# Patient Record
Sex: MALE | Race: OTHER | NOT HISPANIC OR LATINO | URBAN - METROPOLITAN AREA
[De-identification: names, ages, dates, MRNs, and addresses within clinical notes are randomized per-mention and may not be internally consistent; named-entity substitution may affect disease eponyms.]

---

## 2021-01-11 ENCOUNTER — EMERGENCY (EMERGENCY)
Facility: HOSPITAL | Age: 41
LOS: 0 days | Discharge: HOME | End: 2021-01-11
Attending: EMERGENCY MEDICINE | Admitting: EMERGENCY MEDICINE
Payer: COMMERCIAL

## 2021-01-11 VITALS
SYSTOLIC BLOOD PRESSURE: 138 MMHG | OXYGEN SATURATION: 98 % | HEIGHT: 66 IN | RESPIRATION RATE: 19 BRPM | TEMPERATURE: 98 F | DIASTOLIC BLOOD PRESSURE: 73 MMHG | HEART RATE: 76 BPM | WEIGHT: 195.99 LBS

## 2021-01-11 DIAGNOSIS — E11.9 TYPE 2 DIABETES MELLITUS WITHOUT COMPLICATIONS: ICD-10-CM
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
## 2021-01-11 DIAGNOSIS — Z91.018 ALLERGY TO OTHER FOODS: ICD-10-CM

## 2021-01-11 DIAGNOSIS — M25.519 PAIN IN UNSPECIFIED SHOULDER: ICD-10-CM
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
## 2021-01-11 DIAGNOSIS — S20.211A CONTUSION OF RIGHT FRONT WALL OF THORAX, INITIAL ENCOUNTER: ICD-10-CM
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
## 2021-01-11 PROCEDURE — 99282 EMERGENCY DEPT VISIT SF MDM: CPT

## 2021-01-11 NOTE — ED PROVIDER NOTE - NSFOLLOWUPINSTRUCTIONS_ED_ALL_ED_FT
Pectoralis Major Tear       A pectoralis major tear (rupture) is an injury in one of the muscles in the chest (pectoralis major). There are two of these muscles in the chest, one on each side. The pectoralis major muscles help straighten the arms and are used when a person pushes forward. A pectoralis major tear often separates the muscle away from bone along the breastbone, collarbone, or upper arm. This injury causes pain and weakness in the chest and shoulder.      What are the causes?    This condition is caused by putting too much stress on the pectoralis major muscle. This is most commonly a result of weight lifting.      What increases the risk?  This condition is more likely to develop in men, especially men who use anabolic steroids. This condition is also more likely to develop in people who participate in any of the following activities:  •Weight lifting.      •Rugby.      •Football.      •Gymnastics.      •Skiing.      •Wrestling.      •Boxing.      •Hockey.        What are the signs or symptoms?  At the time of the tear, you may hear a noise, like a pop or a snap, and feel a ripping pain. Other signs and symptoms may include:  •Swelling.      •Bruising.      •Weakness.      •A bulge or abnormality in the muscle (deformity).      •Pain and tenderness when pressing on the muscle.        How is this diagnosed?  This condition may be diagnosed based on:  •Your symptoms.      •Your medical history.    •A physical exam. Your health care provider may:  •Compare one side of your chest to the other.      •Check for weakness, bruising, and tenderness.      •Imaging tests to find the exact location of the tear and check how severe it is. Tests may include:  •An MRI.      •An ultrasound.          How is this treated?    In young, active people and athletes, this condition is usually treated with surgery to reattach the muscle and repair the tear. If your tear is minor, or if you are older, you may not need surgery.  Nonsurgical treatment may include:  •Wearing a sling for 3–6 weeks to keep your arm still (immobilization).      •Taking NSAIDs to help relieve pain and swelling.      •Doing physical therapy to improve your range of motion and strength.        Follow these instructions at home:    If you have a sling:     •Wear the sling as told by your health care provider. Remove it only as told by your health care provider.      •Loosen the sling if your fingers tingle, become numb, or turn cold and blue.      •Keep the sling clean.    •If the sling is not waterproof:  •Do not let it get wet.      •Cover it with a watertight covering when you take a bath or shower.          Managing pain, stiffness, and swelling    •If directed, put ice on your injured area.  •If you have a removable sling, remove it as told by your health care provider.      •Put ice in a plastic bag.      •Place a towel between your skin and the bag.      •Leave the ice on for 20 minutes, 2–3 times a day.        •Move your fingers often to reduce stiffness and swelling.      Activity     •Ask your health care provider when it is safe to drive if you have a sling on your arm.      •Do exercises as told by your health care provider.      •Return to your normal activities as told by your health care provider. Ask your health care provider what activities are safe for you.      General instructions     •Take over-the-counter and prescription medicines only as told by your health care provider.      • Do not take baths, swim, or use a hot tub until your health care provider approves. Ask your health care provider if you may take showers. You may only be allowed to take sponge baths.      • Do not use any products that contain nicotine or tobacco, such as cigarettes, e-cigarettes, and chewing tobacco. These can delay healing. If you need help quitting, ask your health care provider.      •Keep all follow-up visits as told by your health care provider. This is important.        How is this prevented?    • Do not use anabolic steroids.      •If you start a weight lifting program, make sure that you have supervision. As your fitness improves, you may add weight gradually.      •Warm up and stretch before being active.      •Cool down and stretch after being active.      •Give your body time to rest between periods of activity.      •Make sure to use equipment that fits you.      •Be safe and responsible while being active. This will help you avoid falls.    •Maintain physical fitness, including:  •Strength.      •Flexibility.      •Endurance.          Contact a health care provider if:    •You continue to have pain, swelling, or weakness after 4 weeks.      •Exercising makes your symptoms worse.        Summary    •A pectoralis major tear often separates the muscle away from bone along the breastbone, collarbone, or upper arm.      •This injury causes pain and weakness in the chest and shoulder.      •This condition is caused by putting too much stress on the pectoralis major muscle.      •In young, active people and athletes, this condition is usually treated with surgery to reattach the muscle and repair the tear.      •If the tear is minor or you are older, the injury is treated with rest, a sling, medicines for pain and swelling, and physical therapy.      This information is not intended to replace advice given to you by your health care provider. Make sure you discuss any questions you have with your health care provider.

## 2021-01-11 NOTE — ED PROVIDER NOTE - OBJECTIVE STATEMENT
40 y.o. M with no PMH with right sided shoulder pain sudden onset sharp pain when he fell and moved his shoulder upwards, denies any numbness tingling, pain upon omvement.

## 2021-01-11 NOTE — ED PROVIDER NOTE - PATIENT PORTAL LINK FT
You can access the FollowMyHealth Patient Portal offered by [facility] by registering at the following website: [email or web address]. By joining [employer]’s FollowMyHealth portal, you will also be able to view your health information using other applications (apps) compatible with our system. Two to three times per week

## 2021-01-11 NOTE — ED ADULT NURSE NOTE - OBJECTIVE STATEMENT
Pt is a 40y Male with c/o of right shoulder pain. Pt states he slipped and grabbed onto a rail. Pt states feels like he pulled a muscle in his R shoulder/chest.

## 2021-01-11 NOTE — ED PROVIDER NOTE - CARE PROVIDER_API CALL
Artie Rios)  Orthopaedic Surgery  [address] [place] [address]  Phone: [phone]  Fax: [phone]  Follow Up Time:    0

## 2021-01-11 NOTE — ED ADULT TRIAGE NOTE - CHIEF COMPLAINT QUOTE
" I slipped and tried to grabbed on something and my right shoulder/chest hurts like a pulled muscle."

## 2021-01-11 NOTE — ED PROVIDER NOTE - PROGRESS NOTE DETAILS
ATTENDING NOTE:   41 y/o M here for R CP. Pt states he was falling, grabbed onto something, then felt pain in his R chest. Noticed swelling to R chest wall. No f/c, SOB, numbness/tingling/weakness.   Agree with above exam. Pt able to range shoulder pain localized to R pectoralis muscle.   Pt placed in sling and advised orthopedic f/u.

## 2021-01-11 NOTE — ED PROVIDER NOTE - PHYSICAL EXAMINATION
CONSTITUTIONAL: Well-developed; well-nourished; in no acute distress.   SKIN: warm, dry  HEAD: Normocephalic; atraumatic.  EYES: PERRL, EOMI, no conjunctival erythema  ENT: No nasal discharge; airway clear.  NECK: Supple; non tender.  CARD: + Right sided chest tenderness, + right sided ecchymosis, + decreased internal rotation of right shoulder, otherwise S1, S2 normal; no murmurs, gallops, or rubs. Regular rate and rhythm.   RESP: No wheezes, rales or rhonchi.  ABD: soft ntnd  EXT: Normal ROM.  No clubbing, cyanosis or edema.   LYMPH: No acute cervical adenopathy.  NEURO: Alert, oriented, grossly unremarkable  PSYCH: Cooperative, appropriate.

## 2021-01-11 NOTE — ED PROVIDER NOTE - NS ED ROS FT
Constitutional:  See HPI.   Eyes:  No visual changes, eye pain or discharge.  ENMT:  No hearing changes, pain, discharge or infections. No neck pain or stiffness.  Cardiac:  No chest pain, SOB or edema. No chest pain with exertion.  Respiratory:  No cough or respiratory distress. No hemoptysis.  GI:  No nausea, vomiting, diarrhea, abdominal pain.  :  No dysuria, frequency, hematuria  MS: + right sided chest pain, No joint pain or back pain.  Neuro:  No LOC. No headache or weakness.    Skin:  No skin rash.  Except as in HPI, all other review of systems is negative

## 2025-02-11 NOTE — ED ADULT NURSE NOTE - SKIN CAPILLARY REFILL
Spoke again with Madeline, states she will call me in an hour with an address here in [place].     2 seconds or less